# Patient Record
Sex: MALE | URBAN - METROPOLITAN AREA
[De-identification: names, ages, dates, MRNs, and addresses within clinical notes are randomized per-mention and may not be internally consistent; named-entity substitution may affect disease eponyms.]

---

## 2020-11-20 ENCOUNTER — CLAIMS CREATED FROM THE CLAIM WINDOW (OUTPATIENT)
Dept: URBAN - METROPOLITAN AREA CLINIC 4 | Facility: CLINIC | Age: 55
End: 2020-11-20
Payer: COMMERCIAL

## 2020-11-20 ENCOUNTER — OFFICE VISIT (OUTPATIENT)
Dept: URBAN - METROPOLITAN AREA SURGERY CENTER 16 | Facility: SURGERY CENTER | Age: 55
End: 2020-11-20
Payer: COMMERCIAL

## 2020-11-20 DIAGNOSIS — D12.2 BENIGN NEOPLASM OF ASCENDING COLON: ICD-10-CM

## 2020-11-20 DIAGNOSIS — D12.2 ADENOMA OF ASCENDING COLON: ICD-10-CM

## 2020-11-20 DIAGNOSIS — K52.89 (LYMPHOCYTIC) MICROSCOPIC COLITIS: ICD-10-CM

## 2020-11-20 DIAGNOSIS — D12.4 BENIGN NEOPLASM OF DESCENDING COLON: ICD-10-CM

## 2020-11-20 DIAGNOSIS — K52.89 OTHER SPECIFIED NONINFECTIVE GASTROENTERITIS AND COLITIS: ICD-10-CM

## 2020-11-20 DIAGNOSIS — Z12.11 COLON CANCER SCREENING: ICD-10-CM

## 2020-11-20 PROCEDURE — 88341 IMHCHEM/IMCYTCHM EA ADD ANTB: CPT | Performed by: PATHOLOGY

## 2020-11-20 PROCEDURE — G8907 PT DOC NO EVENTS ON DISCHARG: HCPCS | Performed by: INTERNAL MEDICINE

## 2020-11-20 PROCEDURE — 45380 COLONOSCOPY AND BIOPSY: CPT | Performed by: INTERNAL MEDICINE

## 2020-11-20 PROCEDURE — G9933 CANC DETECTD DURING COL SCRN: HCPCS | Performed by: INTERNAL MEDICINE

## 2020-11-20 PROCEDURE — 88305 TISSUE EXAM BY PATHOLOGIST: CPT | Performed by: PATHOLOGY

## 2020-11-20 PROCEDURE — 88342 IMHCHEM/IMCYTCHM 1ST ANTB: CPT | Performed by: PATHOLOGY

## 2020-11-20 RX ORDER — ROSUVASTATIN CALCIUM 5 MG/1
TABLET, FILM COATED ORAL
Qty: 90 UNSPECIFIED | Status: ACTIVE | COMMUNITY

## 2021-01-28 ENCOUNTER — OFFICE VISIT (OUTPATIENT)
Dept: URBAN - METROPOLITAN AREA CLINIC 105 | Facility: CLINIC | Age: 56
End: 2021-01-28
Payer: COMMERCIAL

## 2021-01-28 VITALS
BODY MASS INDEX: 36.32 KG/M2 | SYSTOLIC BLOOD PRESSURE: 148 MMHG | DIASTOLIC BLOOD PRESSURE: 91 MMHG | HEART RATE: 59 BPM | TEMPERATURE: 97 F | WEIGHT: 185 LBS | HEIGHT: 60 IN

## 2021-01-28 DIAGNOSIS — K52.9 ILEITIS: ICD-10-CM

## 2021-01-28 DIAGNOSIS — E55.9 VITAMIN D DEFICIENCY DISEASE: ICD-10-CM

## 2021-01-28 DIAGNOSIS — Z86.010 PERSONAL HISTORY OF COLONIC POLYPS: ICD-10-CM

## 2021-01-28 DIAGNOSIS — I25.10 CORONARY ARTERY DISEASE INVOLVING NATIVE CORONARY ARTERY OF NATIVE HEART WITHOUT ANGINA PECTORIS: ICD-10-CM

## 2021-01-28 DIAGNOSIS — E78.00 HYPERCHOLESTEROLEMIA: ICD-10-CM

## 2021-01-28 PROBLEM — 443502000: Status: ACTIVE | Noted: 2021-01-28

## 2021-01-28 PROCEDURE — 3017F COLORECTAL CA SCREEN DOC REV: CPT | Performed by: INTERNAL MEDICINE

## 2021-01-28 PROCEDURE — G8482 FLU IMMUNIZE ORDER/ADMIN: HCPCS | Performed by: INTERNAL MEDICINE

## 2021-01-28 PROCEDURE — 1036F TOBACCO NON-USER: CPT | Performed by: INTERNAL MEDICINE

## 2021-01-28 PROCEDURE — 99213 OFFICE O/P EST LOW 20 MIN: CPT | Performed by: INTERNAL MEDICINE

## 2021-01-28 PROCEDURE — G8420 CALC BMI NORM PARAMETERS: HCPCS | Performed by: INTERNAL MEDICINE

## 2021-01-28 RX ORDER — ROSUVASTATIN CALCIUM 5 MG/1
TABLET, FILM COATED ORAL
Qty: 90 UNSPECIFIED | Status: ACTIVE | COMMUNITY

## 2021-01-28 NOTE — PHYSICAL EXAM HENT:
Head,  normocephalic,  atraumatic,  Face,  Face within normal limits,  Ears,  External ears within normal limits,  Nose/Nasopharynx,  External nose  normal appearance,  nares patent,  no nasal discharge,  Mouth and Throat,  Oral cavity appearance normal,  Breath odor normal,  Lips,  Appearance normal
no

## 2021-01-28 NOTE — HPI-OTHER HISTORIES
The pt has a history of recent colon on 11/20/20 revealing a benign polyp and ileitis. The pt notes that he has had intermittent diarrhea but notes no significant change in his bowel habits. He denies melena, hematemesis or hematochezia. The pt notes that he is taking ASA daily.

## 2022-02-10 ENCOUNTER — OFFICE VISIT (OUTPATIENT)
Dept: URBAN - METROPOLITAN AREA CLINIC 105 | Facility: CLINIC | Age: 57
End: 2022-02-10
Payer: COMMERCIAL

## 2022-02-10 DIAGNOSIS — E55.9 VITAMIN D DEFICIENCY DISEASE: ICD-10-CM

## 2022-02-10 DIAGNOSIS — K57.30 DIVERTICULAR DISEASE OF COLON: ICD-10-CM

## 2022-02-10 DIAGNOSIS — Z86.010 PERSONAL HISTORY OF COLONIC POLYPS: ICD-10-CM

## 2022-02-10 DIAGNOSIS — E78.00 HYPERCHOLESTEROLEMIA: ICD-10-CM

## 2022-02-10 PROBLEM — 428283002: Status: ACTIVE | Noted: 2021-01-28

## 2022-02-10 PROBLEM — 733657002: Status: ACTIVE | Noted: 2022-02-10

## 2022-02-10 PROBLEM — 13644009: Status: ACTIVE | Noted: 2021-01-28

## 2022-02-10 PROCEDURE — 99214 OFFICE O/P EST MOD 30 MIN: CPT | Performed by: INTERNAL MEDICINE

## 2022-02-10 RX ORDER — ROSUVASTATIN CALCIUM 5 MG/1
TABLET, FILM COATED ORAL
Qty: 90 UNSPECIFIED | COMMUNITY

## 2022-02-10 NOTE — HPI-TODAY'S VISIT:
The patient with history of colon polyps, hypercholesterolemia and diverticular disease of the colon who presents for f/u office visit. The pt notes that he does have heartburn or indigestion. The pt notes that he is fully vaccinated against COVID. He realizes that he not eating a healthy diet and knows that he needs to improve.  He is due for f/u  colon in 11/2023.

## 2024-02-19 ENCOUNTER — OV EP (OUTPATIENT)
Dept: URBAN - METROPOLITAN AREA CLINIC 105 | Facility: CLINIC | Age: 59
End: 2024-02-19
Payer: COMMERCIAL

## 2024-02-19 VITALS
HEIGHT: 60 IN | HEART RATE: 68 BPM | BODY MASS INDEX: 39.19 KG/M2 | SYSTOLIC BLOOD PRESSURE: 147 MMHG | TEMPERATURE: 97.2 F | WEIGHT: 199.6 LBS | DIASTOLIC BLOOD PRESSURE: 91 MMHG

## 2024-02-19 DIAGNOSIS — K64.8 INTERNAL HEMORRHOIDS: ICD-10-CM

## 2024-02-19 DIAGNOSIS — E55.9 VITAMIN D DEFICIENCY: ICD-10-CM

## 2024-02-19 DIAGNOSIS — K57.90 DIVERTICULOSIS: ICD-10-CM

## 2024-02-19 DIAGNOSIS — Z86.010 PERSONAL HISTORY OF COLONIC POLYPS: ICD-10-CM

## 2024-02-19 PROBLEM — 55822004: Status: ACTIVE | Noted: 2024-02-19

## 2024-02-19 PROBLEM — 34713006: Status: ACTIVE | Noted: 2024-02-19

## 2024-02-19 PROBLEM — 397881000: Status: ACTIVE | Noted: 2024-02-19

## 2024-02-19 PROBLEM — 428375006: Status: ACTIVE | Noted: 2024-02-19

## 2024-02-19 PROCEDURE — 99244 OFF/OP CNSLTJ NEW/EST MOD 40: CPT | Performed by: INTERNAL MEDICINE

## 2024-02-19 PROCEDURE — 99204 OFFICE O/P NEW MOD 45 MIN: CPT | Performed by: INTERNAL MEDICINE

## 2024-02-19 RX ORDER — CLOPIDOGREL BISULFATE 75 MG/1
1 TABLET TABLET ORAL ONCE A DAY
Status: ACTIVE | COMMUNITY

## 2024-02-19 RX ORDER — OMEGA-3/DHA/EPA/FISH OIL 120-180 MG
1 CAPSULE CAPSULE ORAL ONCE A DAY
Status: ACTIVE | COMMUNITY

## 2024-02-19 RX ORDER — ROSUVASTATIN CALCIUM 5 MG/1
TABLET, FILM COATED ORAL
Qty: 90 UNSPECIFIED | Status: ACTIVE | COMMUNITY

## 2024-02-19 NOTE — HPI-TODAY'S VISIT:
Established patient 57 yo male with PMH of CAD (s/p stent placement 03/2023 and 05/2023), HLD and Vit D deficiency referred by PCP Dr. Sonam Green. The patient presents for a colonoscopy. The patient has a personal history of colon polyps. There is no family history of colon polyps or colon cancer. Patient denies change in bowel habits, appetite, and weight. Patient denies rectal bleeding. Patient denies any lung or kidney problems. Patient denies any digestive symptoms currently. Last colonoscopy: 11/2020 with Dr. Eaton. 1 TA polyp in the ascending colon. Diverticulosis. Nonspecific ileitis. Internal hemorrhoids. Repeat in 3-5 years.  Diagnosed with CAD last year with a screening chest CT scan. He had 3 stent placements in March 2023 and 1 more stent placement that following May. He has never had an MI or CVA. Currently taking daily ASA and Plavix 75 mg QD. Followed by Dr. Hook cardiologist.

## 2024-09-18 ENCOUNTER — DASHBOARD ENCOUNTERS (OUTPATIENT)
Age: 59
End: 2024-09-18

## 2024-09-18 ENCOUNTER — OFFICE VISIT (OUTPATIENT)
Dept: URBAN - METROPOLITAN AREA CLINIC 105 | Facility: CLINIC | Age: 59
End: 2024-09-18
Payer: COMMERCIAL

## 2024-09-18 VITALS
TEMPERATURE: 97.2 F | SYSTOLIC BLOOD PRESSURE: 151 MMHG | WEIGHT: 189.4 LBS | DIASTOLIC BLOOD PRESSURE: 80 MMHG | BODY MASS INDEX: 37.18 KG/M2 | HEART RATE: 55 BPM | HEIGHT: 60 IN

## 2024-09-18 DIAGNOSIS — Z95.5 HX OF HEART ARTERY STENT: ICD-10-CM

## 2024-09-18 DIAGNOSIS — Z86.010 PERSONAL HISTORY OF COLONIC POLYPS: ICD-10-CM

## 2024-09-18 PROBLEM — 429074009: Status: ACTIVE | Noted: 2024-09-18

## 2024-09-18 PROCEDURE — 99212 OFFICE O/P EST SF 10 MIN: CPT | Performed by: INTERNAL MEDICINE

## 2024-09-18 RX ORDER — CLOPIDOGREL BISULFATE 75 MG/1
1 TABLET TABLET ORAL ONCE A DAY
Status: ACTIVE | COMMUNITY

## 2024-09-18 RX ORDER — ASPIRIN 325 MG/1
1 TABLET TABLET, FILM COATED ORAL ONCE A DAY
Status: ACTIVE | COMMUNITY

## 2024-09-18 RX ORDER — LOSARTAN POTASSIUM 25 MG/1
1 TABLET TABLET, FILM COATED ORAL ONCE A DAY
Status: ACTIVE | COMMUNITY

## 2024-09-18 RX ORDER — OMEGA-3/DHA/EPA/FISH OIL 120-180 MG
1 CAPSULE CAPSULE ORAL ONCE A DAY
Status: ON HOLD | COMMUNITY

## 2024-09-18 RX ORDER — ROSUVASTATIN CALCIUM 20 MG/1
1 TABLET TABLET, COATED ORAL ONCE A DAY
Status: ACTIVE | COMMUNITY

## 2024-09-18 NOTE — HPI-TODAY'S VISIT:
Established patient 57 yo male with PMH of CAD (s/p stent placement 03/2023 and 05/2023), HLD and Vit D deficiency referred by PCP Dr. Sonam Green. The patient presents for a colonoscopy. The patient has a personal history of colon polyps. There is no family history of colon polyps or colon cancer. Patient denies change in bowel habits, appetite, and weight. Patient denies rectal bleeding. Patient denies any lung or kidney problems. Patient denies any digestive symptoms currently. Last colonoscopy: 11/2020 with Dr. Eaton. 1 TA polyp in the ascending colon. Diverticulosis. Nonspecific ileitis. Internal hemorrhoids. Repeat in 3-5 years.  Diagnosed with CAD last year with a screening chest CT scan. He had 3 stent placements in March 2023 and 1 more stent placement that following May. States he had stent placed in for a subarachnoid hemorrhage/aneurysm in 7/2024 - at that time he had a severe HA and was in neuro ICU. He has never had an MI. Currently taking daily  mg and Plavix 75 mg QD. Followed by Dr. Hook cardiologist and Dr. Catalino Echols.

## 2024-11-14 ENCOUNTER — TELEPHONE ENCOUNTER (OUTPATIENT)
Dept: URBAN - METROPOLITAN AREA CLINIC 105 | Facility: CLINIC | Age: 59
End: 2024-11-14

## 2024-11-27 ENCOUNTER — TELEPHONE ENCOUNTER (OUTPATIENT)
Dept: URBAN - METROPOLITAN AREA CLINIC 105 | Facility: CLINIC | Age: 59
End: 2024-11-27

## 2024-12-18 ENCOUNTER — OFFICE VISIT (OUTPATIENT)
Dept: URBAN - METROPOLITAN AREA SURGERY CENTER 16 | Facility: SURGERY CENTER | Age: 59
End: 2024-12-18
Payer: COMMERCIAL

## 2024-12-18 ENCOUNTER — CLAIMS CREATED FROM THE CLAIM WINDOW (OUTPATIENT)
Dept: URBAN - METROPOLITAN AREA CLINIC 4 | Facility: CLINIC | Age: 59
End: 2024-12-18
Payer: COMMERCIAL

## 2024-12-18 DIAGNOSIS — K63.5 BENIGN COLON POLYP: ICD-10-CM

## 2024-12-18 DIAGNOSIS — K63.5 POLYP OF COLON: ICD-10-CM

## 2024-12-18 DIAGNOSIS — Z86.0100 HISTORY OF COLON POLYPS: ICD-10-CM

## 2024-12-18 PROCEDURE — 45385 COLONOSCOPY W/LESION REMOVAL: CPT | Performed by: INTERNAL MEDICINE

## 2024-12-18 PROCEDURE — 88305 TISSUE EXAM BY PATHOLOGIST: CPT | Performed by: PATHOLOGY

## 2024-12-18 RX ORDER — LOSARTAN POTASSIUM 25 MG/1
1 TABLET TABLET, FILM COATED ORAL ONCE A DAY
Status: ACTIVE | COMMUNITY

## 2024-12-18 RX ORDER — OMEGA-3/DHA/EPA/FISH OIL 120-180 MG
1 CAPSULE CAPSULE ORAL ONCE A DAY
Status: ON HOLD | COMMUNITY

## 2024-12-18 RX ORDER — ROSUVASTATIN CALCIUM 20 MG/1
1 TABLET TABLET, COATED ORAL ONCE A DAY
Status: ACTIVE | COMMUNITY

## 2024-12-18 RX ORDER — ASPIRIN 325 MG/1
1 TABLET TABLET, FILM COATED ORAL ONCE A DAY
Status: ACTIVE | COMMUNITY

## 2024-12-18 RX ORDER — CLOPIDOGREL BISULFATE 75 MG/1
1 TABLET TABLET ORAL ONCE A DAY
Status: ACTIVE | COMMUNITY